# Patient Record
Sex: MALE
[De-identification: names, ages, dates, MRNs, and addresses within clinical notes are randomized per-mention and may not be internally consistent; named-entity substitution may affect disease eponyms.]

---

## 2018-12-11 ENCOUNTER — HOSPITAL ENCOUNTER (INPATIENT)
Dept: HOSPITAL 42 - ED | Age: 43
LOS: 2 days | Discharge: HOME | DRG: 312 | End: 2018-12-13
Attending: INTERNAL MEDICINE | Admitting: INTERNAL MEDICINE
Payer: COMMERCIAL

## 2018-12-11 VITALS — BODY MASS INDEX: 25.6 KG/M2

## 2018-12-11 DIAGNOSIS — I65.29: ICD-10-CM

## 2018-12-11 DIAGNOSIS — D72.821: ICD-10-CM

## 2018-12-11 DIAGNOSIS — F40.240: ICD-10-CM
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
## 2018-12-11 DIAGNOSIS — E78.5: ICD-10-CM

## 2018-12-11 DIAGNOSIS — I10: ICD-10-CM

## 2018-12-11 DIAGNOSIS — J02.9: ICD-10-CM

## 2018-12-11 DIAGNOSIS — R65.10: ICD-10-CM

## 2018-12-11 DIAGNOSIS — I95.9: ICD-10-CM

## 2018-12-11 DIAGNOSIS — D64.9: ICD-10-CM

## 2018-12-11 DIAGNOSIS — E55.9: ICD-10-CM

## 2018-12-11 DIAGNOSIS — E78.00: ICD-10-CM

## 2018-12-11 DIAGNOSIS — E78.1: ICD-10-CM

## 2018-12-11 DIAGNOSIS — J32.0: ICD-10-CM

## 2018-12-11 DIAGNOSIS — E86.1: ICD-10-CM

## 2018-12-11 DIAGNOSIS — R00.0: ICD-10-CM

## 2018-12-11 DIAGNOSIS — R55: Primary | ICD-10-CM

## 2018-12-11 DIAGNOSIS — R70.0: ICD-10-CM

## 2018-12-11 DIAGNOSIS — I07.1: ICD-10-CM

## 2018-12-11 DIAGNOSIS — E86.0: ICD-10-CM

## 2018-12-11 DIAGNOSIS — I27.20: ICD-10-CM

## 2018-12-11 DIAGNOSIS — N17.9: ICD-10-CM

## 2018-12-11 DIAGNOSIS — J32.2: ICD-10-CM

## 2018-12-11 LAB
ALBUMIN SERPL-MCNC: 4.8 G/DL (ref 3–4.8)
ALBUMIN/GLOB SERPL: 1.2 {RATIO} (ref 1.1–1.8)
ALT SERPL-CCNC: 52 U/L (ref 7–56)
APPEARANCE UR: CLEAR
APTT BLD: 24.3 SECONDS (ref 25.1–36.5)
AST SERPL-CCNC: 38 U/L (ref 17–59)
BASOPHILS # BLD AUTO: 0.02 K/MM3 (ref 0–2)
BASOPHILS NFR BLD: 0.2 % (ref 0–3)
BILIRUB UR-MCNC: NEGATIVE MG/DL
BNP SERPL-MCNC: 14.6 PG/ML (ref 0–450)
BUN SERPL-MCNC: 32 MG/DL (ref 7–21)
CALCIUM SERPL-MCNC: 9.5 MG/DL (ref 8.4–10.5)
COLOR UR: YELLOW
EOSINOPHIL # BLD: 0 10*3/UL (ref 0–0.7)
EOSINOPHIL NFR BLD: 0.4 % (ref 1.5–5)
ERYTHROCYTE [DISTWIDTH] IN BLOOD BY AUTOMATED COUNT: 13 % (ref 11.5–14.5)
GFR NON-AFRICAN AMERICAN: 47
GLUCOSE UR STRIP-MCNC: NEGATIVE MG/DL
GRANULOCYTES # BLD: 8.32 10*3/UL (ref 1.4–6.5)
GRANULOCYTES NFR BLD: 81.1 % (ref 50–68)
HGB BLD-MCNC: 13.7 G/DL (ref 14–18)
INR PPP: 1.18
LEUKOCYTE ESTERASE UR-ACNC: NEGATIVE LEU/UL
LYMPHOCYTES # BLD: 1.4 10*3/UL (ref 1.2–3.4)
LYMPHOCYTES NFR BLD AUTO: 13.5 % (ref 22–35)
MCH RBC QN AUTO: 27.1 PG (ref 25–35)
MCHC RBC AUTO-ENTMCNC: 33.3 G/DL (ref 31–37)
MCV RBC AUTO: 81.4 FL (ref 80–105)
MONOCYTES # BLD AUTO: 0.5 10*3/UL (ref 0.1–0.6)
MONOCYTES NFR BLD: 4.8 % (ref 1–6)
PH UR STRIP: 6 [PH] (ref 4.7–8)
PLATELET # BLD: 246 10^3/UL (ref 120–450)
PMV BLD AUTO: 9.5 FL (ref 7–11)
PROT UR STRIP-MCNC: NEGATIVE MG/DL
PROTHROMBIN TIME: 13.6 SECONDS (ref 9.4–12.5)
RBC # BLD AUTO: 5.05 10^6/UL (ref 3.5–6.1)
RBC # UR STRIP: NEGATIVE /UL
SP GR UR STRIP: 1.02 (ref 1–1.03)
T4 FREE SERPL-MCNC: 0.75 NG/DL (ref 0.78–2.19)
T4 SERPL-MCNC: 6.1 UG/DL (ref 5.5–11)
TROPONIN I SERPL-MCNC: < 0.01 NG/ML
URATE SERPL-MCNC: 6.9 MG/DL (ref 3.5–8.5)
UROBILINOGEN UR STRIP-ACNC: 0.2 E.U./DL
WBC # BLD AUTO: 10.3 10^3/UL (ref 4.5–11)

## 2018-12-11 RX ADMIN — ENOXAPARIN SODIUM SCH MG: 40 INJECTION SUBCUTANEOUS at 22:12

## 2018-12-11 NOTE — HP
DATE OF EXAM:  12/11/2018



HISTORY OF PRESENT ILLNESS:  The patient is a 43-year-old  male who

presented with history of recurrent dizziness while the patient has been

experiencing for few weeks to days.  The patient complains of severe and

recurrent dizziness, lightheadedness, but today while the patient was at

work the presented with a loss of consciousness and syncopal episode

according to the patient and the ER records.  The patient had a syncopal

episode and loss of consciousness at work.  The patient complained of

worsening dizziness and lightheadedness and the patient fell on the floor

with loss of consciousness, but regained consciousness spontaneously. 

According to the patient, his symptoms of dizziness and lightheadedness has

been going on for weeks and months which has been increasing in severity

and frequency.  The patient also complained of rib cage pain after the

patient experienced fall.



CODE STATUS:  Full code.



LIVING WILL/ADVANCE DIRECTIVE:  None.



ALLERGIES  None.



Height is 5 feet 2 inches.



Weight is 140.



BMI is 26.



HOME MEDICATIONS:  Percocet 1 tablet every 6 p.r.n., Naprosyn 500 every 6

p.r.n., Flexeril 10 mg every 8.



PAST MEDICAL AND SURGICAL HISTORY:  History of questionable hypertension,

history of dizziness and lightheadedness, history of hypertension.  Past

medical history is significant for hypertension and dizziness.  _____ The

patient's family history is negative.  The patient denies any surgical

history.  Denies any history of cerebral stroke.  Denies any history of

coronary artery disease.  Denies any surgical history.



FAMILY HISTORY:  _____ Not available.



SOCIAL HISTORY:  Positive for alcohol use.  Denies substance abuse.  Denies

smoking.  Denies drug use.  Denies communicable transmissible disease.



OCCUPATIONAL HISTORY:  The patient is employed.



REVIEW OF SYSTEMS:  A 14-system review was done, pertinent positive

negative dictated above.



PHYSICAL EXAMINATION

GENERAL:  The patient is seen in stretcher #8 in the emergency room.  The

patient is lying in the bed, appears to be anxious.  The patient is seen

lying in the stretcher.

VITAL SIGNS:  T-max is 98, heart rate 83 to 90.  The telemetry shows normal

sinus rhythm.  Blood pressures 105/62, respiration 17 to 18, O2 sat 98% to

99%.

HEENT:  Head is normocephalic, atraumatic.  HEENT examination shows pink

conjunctivae.  Anicteric sclerae.  No oropharyngeal lesion.

CHEST:  Kyphosis.

NECK:  No neck rigidity.

CARDIOPULMONARY:  S1, S2, regular rhythm.  No audible murmur, gallop or

rub.

LUNGS:  Shows no audible crackle, rales or wheezing.

ABDOMEN:  Soft.  Positive bowel sound.  No palpable hepatosplenomegaly.  No

organomegaly.  No guarding.  No rigidity.  No rebound tenderness.

GENITALIA:  Male.

RECTAL:  Deferred.

EXTREMITIES:  Shows no pitting edema.  No calf tenderness.  No Homans'

sign.

NEUROLOGIC:  The patient is alert, awake, oriented x3.  He is able to move

upper and lower extremity without assistance.  The patient has multiple

tattoos on the skin, on the body.  Motor strength is 5/5 in upper and lower

extremity.  Gait examination is not tested.  Cranial nerves II-XII grossly

intact.

VASCULAR:  Palpable pulses.



DIAGNOSTICS:  WBC 10.3, hemoglobin and hematocrit 13.7 and 41.1, platelet

246.  Granulocytes 81% segs.  ESR is 21 which is borderline elevated.  PT

and PTT 13.6 and 24.3.  Sodium 137, potassium 4.5, chloride 98, CO2 31,

anion gap 12, BUN 32, creatinine 1.6.  GFR 47.  Glucose 108, uric acid 6.9.

Calcium 9.5.  LFTs are normal.  Troponin is normal.  BNP is negative. 

Urinalysis, pH 6.0, specific gravity 1.020.  Rest of the urinalysis

negative.  Urine drug screen is negative.  Chest x-ray, the patient had a

chest x-ray done which shows no active pulmonary disease.  EKG done in the

emergency room which shows sinus rhythm with right axis deviation.  CT head

was done in the emergency room which shows unremarkable CT scan of the

head.  The patient was seen in the emergency room.  The patient was

evaluated.



IMPRESSION:

1.  Syncope, etiology undetermined with history of recurrent dizziness and

lightheadedness.

2.  Hypotension with hypovolemia.

3.  Mild normocytic anemia with granulocytosis.

4.  Acute kidney injury.

5.  Right axis deviation.

6.  History of hypertension, but normotensive at present.

7.  Questionable anxiety disorder.



PLAN:  At this time, the patient will be placed on telemetry.  The patient

has been ordered repeat labs, repeat cardiac enzyme, repeat troponin,

repeat EKG.  RPR, Lyme disease, hemoglobin A1c, thyroid panel has been

ordered.  Repeat CBC has been ordered.  Cardiology, Neurology consultation

ordered.  RPR ordered.  The patient is started on Colace 100 three times a

day, Ecotrin 81 mg daily, Ringer's lactate 100 mL an hour, Lipitor 40 mg

daily, Lovenox 40 mg subcutaneously daily, Protonix 40 mg daily.  The

patient received 2 liters of IV fluid in the emergency room.  The patient

was started on Tylenol 650 every 6 p.r.n., Xanax 0.25 p.o. every 8 p.r.n.,

Zofran 4 mg IV every 4 p.r.n.  Carotid Doppler, MRI, MRA of the brain, EEG,

echo with Doppler.  Repeat EKG has been ordered.  The patient has been

started on heart healthy diet.  The patient has been put on head of the bed

at 30 degrees, neuro checks, seizure precaution.  All above has been

ordered.



Dictated and electronically signed, not read.







__________________________________________

Mo Patel MD





DD:  12/11/2018 19:25:19

DT:  12/11/2018 19:29:27

Job # [id number]

## 2018-12-11 NOTE — CARD
--------------- APPROVED REPORT --------------





Date of service: 12/11/2018



EKG Measurement

Heart [id number]

[id number]

[id number]

[id number]

[id number]



<Conclusion>

Normal sinus rhythm

Rightward axis

Borderline ECG

## 2018-12-11 NOTE — CT
Date of service: 



12/11/2018



PROCEDURE:  CT HEAD WITHOUT CONTRAST.



HISTORY:

loss of consciousness



COMPARISON:

None available.



TECHNIQUE:

Axial computed tomography images were obtained through the head/brain 

without intravenous contrast.  



Radiation dose:



Total exam DLP = 815.28 mGy-cm.



This CT exam was performed using one or more of the following dose 

reduction techniques: Automated exposure control, adjustment of the 

mA and/or kV according to patient size, and/or use of iterative 

reconstruction technique.



FINDINGS:



HEMORRHAGE:

No intracranial hemorrhage. 



BRAIN:

Normal gray-white matter differentiation and density are appreciated 

throughout the cerebrum and cerebellum with the brainstem appearing 

unremarkable as well.  There is no mass effect.  There is no 

suspicious extra-axial fluid collection and the midline brain anatomy 

appears diffusely unremarkable. 



VENTRICLES:

Unremarkable. No hydrocephalus. 



CALVARIUM:

Unremarkable.



PARANASAL SINUSES:

Limited bilateral ethmoid sinusitis.



MASTOID AIR CELLS:

Unremarkable as visualized. No inflammatory changes.



OTHER FINDINGS:

None.



IMPRESSION:

Unremarkable noncontrast head CT as per above.

## 2018-12-11 NOTE — RAD
Date of service: 



12/11/2018



HISTORY:

 chest pain 



COMPARISON:

No prior. 



FINDINGS:



LUNGS:

No active pulmonary disease.



PLEURA:

No significant pleural effusion identified, no pneumothorax apparent.



CARDIOVASCULAR:

No atherosclerotic calcification present



Normal.



OSSEOUS STRUCTURES:

No significant abnormalities.



VISUALIZED UPPER ABDOMEN:

Normal.



OTHER FINDINGS:

None.



IMPRESSION:

No active disease.

## 2018-12-11 NOTE — ED PDOC
Arrival/HPI





- General


Time Seen by Provider: 12/11/18 13:33


Historian: Patient





- History of Present Illness


Narrative History of Present Illness (Text): 





12/11/18 13:45


43 year old male, with no significant past medical history, presents to the 

Emergency department s/p syncopal episode at work prior to arrival. Patient 

states a sudden onset of dizziness leading him to lose consciousness and fall to

the floor for couple of minutes. Patient soon regained consciousness and was at 

his baseline. Patient currently reports dizziness and states the symptoms have 

been ongoing for 3 months, progressively increasing in frequency. Patient 

describes the dizziness as room spinning associated with nausea. Patient reports

visiting his PMD with the presented symptoms without any significant findings. 

Patient currentlt denies any other associated somatic complaints. Patient denies

any fevers, chills, headache, chest pain, shortness of breath, dyspnea on 

exertion, cough, abdominal pain, vomiting, diarrhea, back pain, neck pain, or 

any other complaints. Patient denies any significant family cardiac history.


 


Time/Duration: Prior to Arrival


Symptom Onset: Sudden


Symptom Course: Improving


Activities at Onset: Light


Context: Work





Past Medical History





- Provider Review


Nursing Documentation Reviewed: Yes





- Infectious Disease


Hx of Infectious Diseases: None





- Tetanus Immunization


Tetanus Immunization: Unknown





- Psychiatric


Hx Substance Use: No





- Anesthesia


Hx Anesthesia: No





Family/Social History





- Physician Review


Nursing Documentation Reviewed: Yes


Family/Social History: Unknown Family HX


Smoking Status: Never Smoked


Hx Alcohol Use: No


Hx Substance Use: No





Allergies/Home Meds


Allergies/Adverse Reactions: 


Allergies





No Known Allergies Allergy (Verified 12/16/16 17:36)


   








Home Medications: 


                                    Home Meds











 Medication  Instructions  Recorded  Confirmed


 


Naproxen [Naprosyn] 500 mg PO Q6 PRN 12/16/16 12/16/16














Review of Systems





- Physician Review


All systems were reviewed & negative as marked: Yes





- Review of Systems


Constitutional: absent: Fevers


Eyes: absent: Vision Changes


Respiratory: absent: SOB, Cough


Cardiovascular: Chest Pain (Chronic, intermittent ).  absent: VALDOVINOS


Gastrointestinal: Abdominal Pain (Chronic), Nausea.  absent: Diarrhea, Vomiting


Genitourinary Male: absent: Dysuria, Urinary Output Changes


Musculoskeletal: absent: Back Pain, Neck Pain


Skin: absent: Rash


Neurological: Dizziness.  absent: Headache





Physical Exam





- Physical Exam


Narrative Physical Exam (Text): 





12/11/18 13:51


Gen: VS reviewed, alert, well developed, well nourished, nontoxic, mild 

distress.


ENT: normal pharynx.


Eye: EOMI, PERRL.


Neck: no JVD, supple, no adenopathy.


CV: regular rate, regular rhythm, no rubs, no murmur, no gallops, S1, S2, pulses

equal and strong.


Pulm: no distress, clear to auscultation, no wheeze, no rhonchi, breath sounds 

equal, no rales.


Abd: soft, nontender, no guarding, no rebound, no rigidity, normal bowel sounds.


Ext: no edema.


Skin: good color, no rash, no cyanosis.


Psych: responds appropriately to questions, normal affect.


Neuro: oriented x 3, CN2-12 intact grossly, motor intact, sensation intact.





Vital Signs Reviewed: Yes





Vital Signs











  Temp Pulse Resp BP Pulse Ox


 


 12/11/18 13:43  98.0 F  90  17  105/62  99


 


 12/11/18 13:33  98.4 F  83  18  105/62  99











Temperature: Afebrile


Blood Pressure: Normal


Pulse: Regular


Respiratory Rate: Normal


Appearance: Positive for: Well-Appearing, Non-Toxic, Comfortable


Pain Distress: None


Mental Status: Positive for: Alert and Oriented X 3





Medical Decision Making


ED Course and Treatment: 





12/11/18 13:44


Impression: 43 year old male presents to the ED s/p syncopal episode prior to 

arrival. 





Plan:


-- EKG


-- Labs


-- Chest X-ray


-- IV fluids


-- Reassess and disposition





Prior Visits:


Notes and results from previous visits were reviewed. 





Progress Notes:








12/11/18 16:18


admit accepted by dr. fuentes. patient to be admitted for recurrent dizziness and 

syncopal episode. patient reports urinary frequency but no dysuria. bloodwork 

appears consistent with dehydration. will get follow up CT at the request of dr. fuentes.





12/11/18 17:14


Head CT reviewed, shows:


Unremarkable noncontrast head CT as per above. 





Chest X-ray 


Impression: 


no active disease





- RAD Interpretation


Narrative RAD Interpretations (Text): 





12/11/18 15:12


Chest X-ray reviewed by radiologist, shows:


FINDINGS:


LUNGS:


No active pulmonary disease.


PLEURA:


No significant pleural effusion identified, no pneumothorax apparent.


CARDIOVASCULAR:


No atherosclerotic calcification present


Normal.


OSSEOUS STRUCTURES:


No significant abnormalities.


VISUALIZED UPPER ABDOMEN:


Normal.


OTHER FINDINGS:


None.


IMPRESSION:


No active disease.


Radiology Orders: 











12/11/18 13:44


CHEST PORTABLE [RAD] Stat 











: Radiologist





- EKG Interpretation


EKG Interpretation (Text): 





12/11/18 15:09


1323: nsr at 83 bpm, nml qrs, nml axis, no acute sttw abn


Interpreted by ED Physician: Yes





- Scribe Statement


The provider has reviewed the documentation as recorded by the Scribe


Abida Short.





All medical record entries made by the Lindaibe were at my direction and 

personally dictated by me. I have reviewed the chart and agree that the record 

accurately reflects my personal performance of the history, physical exam, 

medical decision making, and the department course for this patient. I have also

 personally directed, reviewed, and agree with the discharge instructions and 

disposition.








Disposition/Present on Arrival





- Present on Arrival


History of DVT/PE: No


History of Uncontrolled Diabetes: No


Urinary Catheter: No


History Surgical Site Infection Following: None





- Disposition

## 2018-12-12 VITALS — RESPIRATION RATE: 20 BRPM

## 2018-12-12 LAB
ALBUMIN SERPL-MCNC: 4 G/DL (ref 3–4.8)
ALBUMIN/GLOB SERPL: 1.2 {RATIO} (ref 1.1–1.8)
ALT SERPL-CCNC: 55 U/L (ref 7–56)
AST SERPL-CCNC: 32 U/L (ref 17–59)
B BURGDOR IGM SERPL QL IA: NEGATIVE
BASE EXCESS BLDV CALC-SCNC: 4.2 MMOL/L (ref 0–2)
BASOPHILS # BLD AUTO: 0.02 K/MM3 (ref 0–2)
BASOPHILS NFR BLD: 0.2 % (ref 0–3)
BILIRUB DIRECT SERPL-MCNC: 0.3 MG/DL (ref 0–0.4)
BUN SERPL-MCNC: 25 MG/DL (ref 7–21)
CALCIUM SERPL-MCNC: 8.9 MG/DL (ref 8.4–10.5)
EOSINOPHIL # BLD: 0.2 10*3/UL (ref 0–0.7)
EOSINOPHIL NFR BLD: 2.5 % (ref 1.5–5)
EOSINOPHIL NFR BLD: 3 % (ref 0–3)
ERYTHROCYTE [DISTWIDTH] IN BLOOD BY AUTOMATED COUNT: 13 % (ref 11.5–14.5)
FOLATE SERPL-MCNC: 8.1 NG/ML
GFR NON-AFRICAN AMERICAN: > 60
GRANULOCYTES # BLD: 5.57 10*3/UL (ref 1.4–6.5)
GRANULOCYTES NFR BLD: 63.8 % (ref 50–68)
HDLC SERPL-MCNC: 30 MG/DL (ref 29–60)
HEPATITIS A IGM: NEGATIVE
HEPATITIS B CORE AB: NEGATIVE
HEPATITIS C ANTIBODY: NEGATIVE
HGB BLD-MCNC: 11.7 G/DL (ref 14–18)
LDLC SERPL-MCNC: 107 MG/DL (ref 0–129)
LYMPHOCYTE: 9 % (ref 22–35)
LYMPHOCYTES # BLD: 1.1 10*3/UL (ref 1.2–3.4)
LYMPHOCYTES NFR BLD AUTO: 12.2 % (ref 22–35)
MCH RBC QN AUTO: 26.8 PG (ref 25–35)
MCHC RBC AUTO-ENTMCNC: 32.7 G/DL (ref 31–37)
MCV RBC AUTO: 82.1 FL (ref 80–105)
MONOCYTE: 20 % (ref 1–6)
MONOCYTES # BLD AUTO: 1.9 10*3/UL (ref 0.1–0.6)
MONOCYTES NFR BLD: 21.3 % (ref 1–6)
NEUTROPHILS NFR BLD AUTO: 66 % (ref 50–70)
PH BLDV: 7.43 [PH] (ref 7.32–7.43)
PLATELET # BLD: 203 10^3/UL (ref 120–450)
PMV BLD AUTO: 9.7 FL (ref 7–11)
RAPID PLASMA REAGIN: NONREACTIVE
RBC # BLD AUTO: 4.36 10^6/UL (ref 3.5–6.1)
TROPONIN I SERPL-MCNC: < 0.01 NG/ML
VARIANT LYMPHS NFR BLD MANUAL: 2 % (ref 0–0)
VENOUS BLOOD FIO2: 21 %
VENOUS BLOOD GAS PCO2: 44 (ref 40–60)
VENOUS BLOOD GAS PO2: 61 MM/HG (ref 30–55)
VIT B12 SERPL-MCNC: 348 PG/ML (ref 239–931)
WBC # BLD AUTO: 8.7 10^3/UL (ref 4.5–11)

## 2018-12-12 RX ADMIN — ACETYLCYSTEINE SCH ML: 200 INHALANT RESPIRATORY (INHALATION) at 20:29

## 2018-12-12 RX ADMIN — PURIFIED WATER PRN LOZ: 99.05 LIQUID OPHTHALMIC at 11:03

## 2018-12-12 RX ADMIN — LEVALBUTEROL SCH MG: 0.63 SOLUTION RESPIRATORY (INHALATION) at 20:29

## 2018-12-12 RX ADMIN — LEVALBUTEROL SCH MG: 0.63 SOLUTION RESPIRATORY (INHALATION) at 08:55

## 2018-12-12 RX ADMIN — ACETYLCYSTEINE SCH ML: 200 INHALANT RESPIRATORY (INHALATION) at 13:17

## 2018-12-12 RX ADMIN — PANTOPRAZOLE SODIUM SCH: 40 TABLET, DELAYED RELEASE ORAL at 05:49

## 2018-12-12 RX ADMIN — ACETYLCYSTEINE SCH ML: 200 INHALANT RESPIRATORY (INHALATION) at 08:55

## 2018-12-12 RX ADMIN — LEVALBUTEROL SCH MG: 0.63 SOLUTION RESPIRATORY (INHALATION) at 13:17

## 2018-12-12 RX ADMIN — ENOXAPARIN SODIUM SCH MG: 40 INJECTION SUBCUTANEOUS at 11:03

## 2018-12-12 NOTE — CARD
--------------- APPROVED REPORT --------------





Date of service: 12/12/2018



EKG Measurement

Heart [id number]

[id number]

[id number]

[id number]

[id number]



<Conclusion>

Sinus tachycardia

Rightward axis

Borderline ECG

## 2018-12-12 NOTE — PN
DATE:  12/12/2018



SUBJECTIVE:  The patient is seen in room 378, bed 2.  The patient is seen

lying in the bed, complaining of diffuse myalgia, aches and pain, sore

throat, congestion and coughing.  The patient was seen lying in the bed

with the patient's nurse at bedside.  The patient also appears to be

anxious.  Overnight nurse's notes were reviewed.  The patient was able to

ambulate without any assistance.



PHYSICAL EXAMINATION:

VITAL SIGNS:  T-max was 101.1 overnight, down to 99.2-98.1, heart rate was

90, 95, 97, 100, blood pressure 116/78, 119/75, respiration 20, O2 sat 97%.

GENERAL:  The patient is seen lying in the bed.  Head examination,

normocephalic, atraumatic.

HEENT examination shows pink conjunctiva.  Pinkish conjunctivae.  Anicteric

sclerae.  No oropharyngeal lesion.  Questionable and positive pharyngeal

erythema noted.  No exudate noted.  No neck rigidity.

CHEST:  Symmetrical.

LUNGS:  Examination shows occasional rhonchi upper lung fields.

CARDIOVASCULAR:  S1, S2, regular rhythm.  Telemetry shows sinus rhythm,

sinus tachycardia.  No audible murmur or rub.

ABDOMEN:  Soft.  Positive bowel sound.  No palpable hepatosplenomegaly.

GENITALIA:  Male.

RECTAL:  Examination is deferred.

EXTREMITIES:  Shows no pitting edema, no calf tenderness, no Bekah's signs.

NEUROLOGIC:  The patient is alert, awake, oriented x3.  Cranial nerves

II-XII intact.  Gait examination is independent.

VASCULAR:  Palpable pulses.

PSYCHIATRIC:  Questionable positive for anxiety.  No auditory, visual

hallucination.  No suicidal, homicidal ideation.



DIAGNOSTICS:  12/12/2018, WBC 8.7, hemoglobin/hematocrit 11.7/35.8,

platelet 203, monocytes at 20.  Sodium 137, potassium 3.7, chloride 100,

CO2 of 29, anion gap 12, BUN down to 25 from 32.  Creatinine is down to 1.1

from 1.6.  LFTs are normal.  Glucose 108, uric acid 6.9, calcium 8.9,

phosphorus 3.4, magnesium 1.7.  LFTs are normal.  Troponin all three sets

are negative.  Triglyceride 241, cholesterol 180, .  Thyroid profile

is within normal limits.  The patient is also complaining of nocturia and

frequency urination.  The patient's urinalysis was negative.  Urine drug

screen is negative.  EKG from today was reviewed which shows sinus

tachycardia, right axis deviation which is similar to the one from

yesterday.



IMPRESSION:

1.  Near syncope and symptoms of dizziness, lightheadedness.

2.  Myalgia.

3.  Fever.

4.  Pharyngitis.

5.  High-grade fever of 101.

6.  Tachycardia.

7.  Questionable systemic inflammatory response syndrome.

8.  Hypotension.

9.  Tachycardia.

10.  Normocytic anemia with granulocytosis.

11.  Monocytosis.

12.  Elevated erythrocyte sedimentation rate.

13.  Acute kidney injury (resolved).

14.  Hypertriglyceridemia and hypercholesteremia with elevated LDL,

decreased HDL.

15.  Questionable nocturia and urinary frequency.

16.  Right axis deviation.

17.  Sinus tachycardia.

18.  Generalized myalgia.



PLAN:  Plan at this time, the patient's hepatitis serologies, B12, folate,

hemoglobin A1c has been ordered and pending.  The patient has been ordered

vitamin D.  The patient's has been ordered repeat CMP, LFT, magnesium,

phosphorus for the morning.  Fructosamine, GlycoMark, HIV, Lyme titers, CBC

is ordered.  Blood and urine cultures are ordered.  Cardiology and

neurology consultation is pending.  The patient has been ordered Rapid

strep, Rapid flu, Lyme, IgG, IgM Rapid RPR.  The patient is started on

Mucomyst nebulizer 20% 4 mL every 6 hours, Cepacol lozenges every 2 hours

p.r.n. Colace 100 mg three times a day, Ecotrin 81 mg daily, IV fluid,

Ringer's lactate at 125 mL an hour, Lipitor 40 mg daily Lovenox 40 mg

subcutaneously daily, Protonix 40 mg daily, the patient is empirically

started on Tamiflu 75 mg twice a day, Tessalon Perles 200 three times a

day, Tylenol 650 mg p.o. suppository every 6 hours p.r.n., Xanax 0.25 every

8 hours p.r.n. for anxiety, Xopenex nebulizer 0.63 mg every 6 hours, Zofran

4 mg IV every 4 hours p.r.n.  The patient's carotid Doppler, MRI, MRA of

the brain, echo with Doppler, EEG is pending.  The patient is on oxygen,

incentive spirometry.  Neuro checks, seizure precaution, out of bed.  The

patient's further management will be dependent upon the patient's clinical

condition, hemodynamic status and as per the patient response to

therapeutic intervention as per the patient's diagnostic test results and

as per recommendation by all the physician involved in the care of the

patient.



Dictated and electronically signed, not read.







__________________________________________

Mo Patel MD



DD:  12/12/2018 9:35:02

DT:  12/12/2018 9:40:24

Job # [id number]

## 2018-12-12 NOTE — CON
DATE:  12/12/2018





NEUROLOGY CONSULTATION



CHIEF COMPLAINT:  Syncope.



HISTORY OF PRESENT ILLNESS:  This is a 43-year-old man with no significant

past medical history. who was at work today _____ syncopal episode,

apparently has had some sudden onset of dizziness in terms of feeling

slightly lightheaded and had lead him to lose unconscious for a few seconds

and fall to the floor, regained consciousness at his baseline.  No history

of seizures.  No history of traumatic brain injury.  No history of febrile

seizures as a child.  _____ the room spinning with some nausea, this is

most likely vertiginous aspect.  MRI of the brain unremarkable for anything

acute as well as MRI of the head.  Carotid Doppler showed 29%-39% proximal

ICA stenosis with antegrade flow in the vertebral arteries.  He is mildly

dehydrated, had low systolic and diastolic blood pressures for his height

and weight.  His PSA was normal, elevated BUN.  He works aggressively at

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  He is on Xanax for anxiety p.r.n.



PAST MEDICAL HISTORY:  Nothing significant.



SOCIAL HISTORY:  No illicit drug use, smoking, or EtOH abuse.



REVIEW OF SYSTEMS:  A 14-point review of systems is negative except as per

the HPI.



FAMILY HISTORY:  Noncontributory.



MEDICATIONS:  Reviewed by nurses' reconciliation sheet.



LABORATORY DATA:  Sodium is 137, potassium of 3.7, chloride 100, carbon

dioxide 29, BUN of 25, creatinine 1.1, random glucose of 108, and A1c 6.4.



PHYSICAL EXAMINATION:

VITAL SIGNS:  Temperature 98.4, pulse rate 94, blood pressure 108/72,

respiratory rate of 20, and oxygen saturation 100% by room air.

GENERAL:  The patient is sitting up in bed, in no acute distress.

HEENT:  Atraumatic and normocephalic.  PERRLA.  Extraocular muscles are

intact.

NECK:  Supple.  No JVD.  No adenopathy noted.

LUNGS:  Clear to auscultation.  No adventitious sounds.

HEART:  S1 and S2.  Normal rate and rhythm.  No murmurs, rubs, or gallops.

ABDOMEN:  Soft, nontender, and nondistended.  Bowel sounds are present.

EXTREMITIES:  No clubbing.  No cyanosis.  Peripheral pulses 2+ felt

bilaterally.

NEUROLOGIC:  The patient is alert and oriented to person, place, month and

year.  Speech is fluent without errors.  Cranial nerves II through are XII

intact.  Motor exam; moves all extremities equally.  No pronator drift

seen.  Sensory exam:  Light touch, pinprick, proprioception, and vibration

are intact.  DTRs are 2+ throughout.  Coordination; finger-to-nose intact. 

No dysmetria noted.  Gait is deferred for now.



IMPRESSION:  Syncope, this most likely seems vasovagal and seems like

mildly dehydrated.  Carotid Doppler showed no significant hemodynamic

stenosis.  In addition, MRI and MRA of the head showed no intracranial

abnormality.  This is unlikely seizure, he had most likely neurocardiogenic

syncope.



At this time, we recommend;

1.  Adequate hydration.

2.  Salt restriction diet to reduce the vertiginous symptoms.

3.  PT/OT evaluation and follow up as an outpatient.







__________________________________________

Rene Small MD





DD:  12/12/2018 17:01:47

DT:  12/12/2018 18:49:51

Job # [id number]

## 2018-12-12 NOTE — MRI
Date of service: 



12/12/2018



PROCEDURE:  Magnetic Resonance Angiography Brain



HISTORY:

Syncope 







COMPARISON:

None available. 



TECHNIQUE:

3D time of flight MR angiography of the intracranial arteries was 

performed. Rotating maximum intensity projection images were 

generated.



FINDINGS:



INTERNAL CAROTID ARTERIES:

Normal flow related signal.  The skull base, petrous, cavernous and 

supraclinoid segments are bilaterally widely patient. 



ANTERIOR CEREBRAL ARTERIES:

Normal flow related signal. A1 and A2 segments are widely patent. 

Smaller distal branches unremarkable, as visualized.



MIDDLE CEREBRAL ARTERIES:

Normal flow related signal. M1 and M2 segments are widely patent. 

Perisylvian branches grossly symmetric.



POSTERIOR CIRCULATION:

Basilar Artery: Unremarkable.



Distal Vertebral Arteries: Unremarkable.  Right vertebral artery is 

hypoplastic, an anatomic variant. 



Posterior Cerebral Arteries: Unremarkable.



Posterior Inferior Cerebellar Arteries: Unremarkable.



ANEURYSM/ VASCULAR MALFORMATIONS:

None.



OTHER FINDINGS:

None. 



IMPRESSION:

Unremarkable MR angiography of the brain.

## 2018-12-12 NOTE — MRI
Date of service: 



12/12/2018



PROCEDURE:  MRI BRAIN WITH AND WITHOUT CONTRAST



HISTORY:

Dizziness 



COMPARISON:

Noncontrast head CT from 12/11/2018. 



TECHNIQUE:

Multiplanar, multisequence MR images of the brain were obtained with 

and without intravenous contrast enhancement.  15 mL Omniscan was 

injected intravenously. 



FINDINGS:



HEMORRHAGE:

None



DWI:

No evidence of an acute or early subacute infarction.



BRAIN PARENCHYMA:

There are multifocal T2/FLAIR hyperintense foci in the subcortical 

supratentorial white matter, larger in the left parietal subcortical 

white matter.  There is no mass, mass effect or abnormal extra-axial 

fluid collection.  The midline sagittal structures are normal. 



ENHANCEMENT:

No abnormal intracranial enhancement.



VENTRICLES:

The ventricles are normal in size, shape and configuration.



CRANIUM:

There is normal bone marrow signal pattern.



ORBITS:

Grossly unremarkable.



PARANASAL SINUSES/MASTOIDS:

There is moderate polypoid mucosal thickening in the right maxillary 

sinus, mild polypoid mucosal thickening in the left maxillary sinus 

and ethmoid air cells.  The mastoid air cells are clear.



VASCULAR SYSTEM:

There are normal signal voids in the larger intracranial arteries. 



OTHER FINDINGS:

None .



IMPRESSION:

No acute intracranial abnormality.



Multifocal supratentorial white matter changes are strictly 

nonspecific, the differential considerations include migraine 

headache effect, gliosis, early chronic microangiopathic changes, 

Lyme disease, vasculitis and demyelinating disease including multiple 

sclerosis.



Chronic maxillary and ethmoid sinusitis, worse in the right maxillary 

sinus.

## 2018-12-12 NOTE — US
PROCEDURE:  Bilateral carotid artery duplex ultrasound 



HISTORY:

Carotid stenosis syncope



PHYSICIAN(S):  Scott Guerrero MD.



TECHNIQUE:

Duplex sonography and color-flow Doppler were used to evaluate the 

carotid bifurcations and limited segments of the vertebral arteries 

bilaterally.



FINDINGS:

There is mild smooth hypoechoic plaque noted at the carotid 

bifurcations bilaterally. The peak systolic velocity in the proximal 

right internal carotid artery is 68 cm/sec. This corresponds to a 20 

to 39% proximal right ICA stenosis. Normal systolic velocities are 

noted in the proximal right external carotid artery. There is 

antegrade flow in the right vertebral artery.



The peak systolic velocity in the proximal left internal carotid 

artery is 92 cm/sec. This corresponds to a 20 to 39% proximal left 

ICA stenosis. Normal systolic velocities are noted in the proximal 

left external carotid artery. There is antegrade flow in the left 

vertebral artery.



IMPRESSION:

1. Bilateral 20-39% proximal ICA stenoses.



2. Antegrade flow in both vertebral arteries.

## 2018-12-12 NOTE — CARD
--------------- APPROVED REPORT --------------





Date of service: 12/12/2018



EXAM: Two-dimensional and M-mode echocardiogram with Doppler and 

color Doppler.



INDICATION

Syncope 



2D DIMENSIONS 

Left Atrium (2D)3.1   (1.6-4.0cm)IVSd1.0   (0.7-1.1cm)

LVDd4.3   (3.9-5.9cm)PWd1.0   (0.7-1.1cm)

LVDs2.8   (2.5-4.0cm)FS (%) 36.3   %

LVEF (%)66.3   (>50%)



M-Mode DIMENSIONS 

Aortic Root3.20   (2.2-3.7cm)Aortic Cusp Exc.1.80   (1.5-2.0cm)



Aortic Valve

AoV Peak Vzsxvdil974.0cm/Kings Peak GR.10mmHg



Mitral Valve

MV E Hppgykve03.0cm/sMV A Gswbpmfk47.7cm/sE/A ratio0.8



TDI

Lateral E' Peak V10.60cm/sMedial E' Peak V9.26cm/sE/Lateral E'7.0

E/Medial E'8.0



Pulmonary Valve

PV Peak Wwcabvja394.0cm/sPV Peak Grad.4mmHg



Tricuspid Valve

TR Peak Veozcjlq961mn/sRAP XHZQZIOC98cgYfGX Peak Gr.29mmHg

QWVL37zpZr



 LEFT VENTRICLE 

The left ventricle is normal size. There is normal left ventricular 

wall thickness. The left ventricle is hyperdynamic. There is normal 

LV segmental wall motion. Transmitral Doppler flow pattern is Grade 

I-abnormal relaxation pattern.



 RIGHT VENTRICLE 

The right ventricle is normal size. There is normal right ventricular 

wall thickness. The right ventricular systolic function is normal.



 ATRIA 

The left atrium size is normal. The right atrium size is normal.



 AORTIC VALVE 

The aortic valve is not well visualized. No aortic regurgitation is 

present. There is no aortic valvular stenosis.



 MITRAL VALVE 

The mitral valve is normal in structure. There is no mitral valve 

regurgitation noted. There is no mitral valve stenosis.



 TRICUSPID VALVE 

There is mild tricuspid regurgitation. There is mild pulmonary 

hypertension.



 GREAT VESSELS 

The aortic root is normal in size.



 PERICARDIAL EFFUSION 

There is no pericardial effusion.



<Conclusion>

There is normal left ventricular wall thickness.

The left ventricle is hyperdynamic.

There is normal LV segmental wall motion.

Transmitral Doppler flow pattern is Grade I-abnormal relaxation 

pattern.

There is mild tricuspid regurgitation.

There is mild pulmonary hypertension.

## 2018-12-13 VITALS — OXYGEN SATURATION: 97 %

## 2018-12-13 VITALS — TEMPERATURE: 98.7 F | HEART RATE: 89 BPM | DIASTOLIC BLOOD PRESSURE: 76 MMHG | SYSTOLIC BLOOD PRESSURE: 127 MMHG

## 2018-12-13 LAB
ALBUMIN SERPL-MCNC: 4.1 G/DL (ref 3–4.8)
ALBUMIN/GLOB SERPL: 1.1 {RATIO} (ref 1.1–1.8)
ALT SERPL-CCNC: 49 U/L (ref 7–56)
APPEARANCE UR: CLEAR
AST SERPL-CCNC: 32 U/L (ref 17–59)
B BURGDOR IGG SERPL QL IA: NEGATIVE
BASOPHILS # BLD AUTO: 0.03 K/MM3 (ref 0–2)
BASOPHILS NFR BLD: 0.3 % (ref 0–3)
BILIRUB DIRECT SERPL-MCNC: 0.2 MG/DL (ref 0–0.4)
BILIRUB UR-MCNC: NEGATIVE MG/DL
BUN SERPL-MCNC: 19 MG/DL (ref 7–21)
CALCIUM SERPL-MCNC: 9 MG/DL (ref 8.4–10.5)
COLOR UR: YELLOW
EOSINOPHIL # BLD: 0.2 10*3/UL (ref 0–0.7)
EOSINOPHIL NFR BLD: 1.7 % (ref 1.5–5)
ERYTHROCYTE [DISTWIDTH] IN BLOOD BY AUTOMATED COUNT: 12.9 % (ref 11.5–14.5)
FRUCTOSAMINE SERPL-SCNC: 195 UMOL/L (ref 190–270)
GFR NON-AFRICAN AMERICAN: > 60
GLUCOSE UR STRIP-MCNC: NEGATIVE MG/DL
GRANULOCYTES # BLD: 5.89 10*3/UL (ref 1.4–6.5)
GRANULOCYTES NFR BLD: 61.5 % (ref 50–68)
HGB BLD-MCNC: 11.8 G/DL (ref 14–18)
LEUKOCYTE ESTERASE UR-ACNC: NEGATIVE LEU/UL
LYMPHOCYTES # BLD: 1.6 10*3/UL (ref 1.2–3.4)
LYMPHOCYTES NFR BLD AUTO: 16.5 % (ref 22–35)
MCH RBC QN AUTO: 26.7 PG (ref 25–35)
MCHC RBC AUTO-ENTMCNC: 32.8 G/DL (ref 31–37)
MCV RBC AUTO: 81.4 FL (ref 80–105)
MONOCYTES # BLD AUTO: 1.9 10*3/UL (ref 0.1–0.6)
MONOCYTES NFR BLD: 20 % (ref 1–6)
PH UR STRIP: 6 [PH] (ref 4.7–8)
PLATELET # BLD: 205 10^3/UL (ref 120–450)
PMV BLD AUTO: 9.4 FL (ref 7–11)
PROT UR STRIP-MCNC: NEGATIVE MG/DL
RBC # BLD AUTO: 4.42 10^6/UL (ref 3.5–6.1)
RBC # UR STRIP: NEGATIVE /UL
SP GR UR STRIP: 1.02 (ref 1–1.03)
UROBILINOGEN UR STRIP-ACNC: 0.2 E.U./DL
WBC # BLD AUTO: 9.6 10^3/UL (ref 4.5–11)

## 2018-12-13 RX ADMIN — PANTOPRAZOLE SODIUM SCH MG: 40 TABLET, DELAYED RELEASE ORAL at 05:49

## 2018-12-13 RX ADMIN — ACETYLCYSTEINE SCH ML: 200 INHALANT RESPIRATORY (INHALATION) at 13:42

## 2018-12-13 RX ADMIN — LEVALBUTEROL SCH MG: 0.63 SOLUTION RESPIRATORY (INHALATION) at 08:47

## 2018-12-13 RX ADMIN — ENOXAPARIN SODIUM SCH MG: 40 INJECTION SUBCUTANEOUS at 09:30

## 2018-12-13 RX ADMIN — LEVALBUTEROL SCH MG: 0.63 SOLUTION RESPIRATORY (INHALATION) at 13:41

## 2018-12-13 RX ADMIN — PURIFIED WATER PRN LOZ: 99.05 LIQUID OPHTHALMIC at 05:55

## 2018-12-13 RX ADMIN — ACETYLCYSTEINE SCH ML: 200 INHALANT RESPIRATORY (INHALATION) at 08:42

## 2018-12-13 NOTE — DS
FINAL PROGRESS NOTE AND DISCHARGE SUMMARY



SUBJECTIVE:  The patient is in room 378, bed 2.  Overnight nurse's notes

were reviewed.  The patient initially was claustrophobic for the MRI

yesterday, but the patient was able to complete the MRI after sedation with

Ativan and Benadryl and the patient tolerated the procedure well.



The patient is seen in room 378, bed 2.  The patient was seen and evaluated

by Neurology.  Neurology recommendations were noted.



OBJECTIVE:

VITAL SIGNS:  Overnight vital signs; T-max 98.7, pulse 77 to 84.  Telemetry

shows sinus rhythm, heart rate 77 to 84, blood pressure 120/80,

respirations 20, and O2 sat 97%.

HEENT:  Head examination; normocephalic and atraumatic.  HEENT examination

shows pink conjunctivae.  Anicteric sclerae.  No oropharyngeal lesion.

NECK:  No neck rigidity.

CHEST:  Kyphosis.

LUNGS:  Shows no audible crackles, rales or wheezing.

CARDIOVASCULAR:  S1 and S2, regular rhythm.  Questionable positive

bilateral sinus tenderness.

ABDOMEN:  Soft.  Positive bowel sounds.  No palpable hepatosplenomegaly. 

No organomegaly.

GENITALIA:  Male.

RECTAL:  Deferred.

EXTREMITIES:  Shows no pitting edema.  No calf tenderness.  No Homans'

sign.

NEUROLOGIC:  The patient is alert, awake, oriented x3, is able to move

upper and lower extremity without assistance.  Gait examination is not

tested.

VASCULAR:  Palpable pulses.  Cranial nerves II through XII intact and

limited.

PSYCHIATRIC:  Questionable positive for anxiety due to hospitalization.

MUSCULOSKELETAL:  As per the body mass index.



DIAGNOSTIC DATA:  On 12/13/2018; WBC 9.6, hemoglobin/hematocrit 11.8 and

36, platelet 205,000, monocytes were 20 as of yesterday's CBC.  Sodium 137,

potassium 4.1, chloride 98, CO2 of 32, BUN 19, creatinine 0.9, glucose 96,

calcium 9, phosphorus 3.8, and magnesium 1.7.  LFTs are within normal

limit.  Vitamin D 25-hydroxy is 17.6.  PSA is 1.3.  CRP is 8.7 and

hemoglobin A1c 6.4 x2.  RPR is negative.  Hepatitis A, B, C serologies are

negative.  Influenza A and B titers are negative.  Strep group B is

negative.  Urinalysis is negative.  Echocardiogram shows left ventricular

ejection fraction 66%.  Right ventricular systolic pressure 39 mmHg,

hyperdynamic left ventricle grade 1 abnormal relaxation pattern, mild

tricuspid regurgitation, and pulmonary hypertension.  The patient was seen

and evaluated by Neurology.  Their impression was vasovagal syncope versus

neurocardiogenic syncope with secondary to dehydration and acute kidney

injury.  MRI and MRA of the brain was noted, which shows hyperintense foci

of the subcortical supratentorial white matter of the left parietal

subcortical white matter with bilateral maxillary and ethmoid sinusitis

right more than the left, noninvasive carotid Doppler 20-39% internal

carotid artery stenosis.  EKG shows right axis deviation.



FINAL IMPRESSION, PLAN, AND DISCHARGE DIAGNOSES:

1.  Recurrent dizziness and lightheadedness with possible vasovagal syncope

versus neurocardiogenic syncope secondary to dehydration.

2.  Acute kidney injury (resolved).

3.  Mild normocytic anemia.

4.  Monocytosis.

5.  Hypovitaminosis D.

6.  Elevated C-reactive protein of 8.7.

7.  Questionable and possible prediabetes with hemoglobin A1c of 6.4.

8.  Mild pulmonary hypertension and mild tricuspid regurgitation.

9.  Hyperdynamic left ventricle.

10.  Questionable history of hypertension.

11.  Mild pulmonary hypertension with right ventricular systolic pressure

of 39 mmHg.

12.  Mild tricuspid regurgitation.

13.  Left ventricular ejection fraction of 66%.

14.  Hyperintense foci of the subcortical supratentorial white matter

greater on the left parietal subcortical white matter.

15.  Bilateral maxillary and ethmoid sinusitis, right more than the left.

16.  Right axis deviation.

17.  A 20-39% internal carotid artery stenosis.

18.  Questionable anxiety disorder.

19.  Questionable history of hypertension.

20.  Hyperlipidemia with elevated LDL and decreased HDL.

21.  Hypertriglyceridemia.

22.  Questionable history of nocturia with normal prostate-specific antigen

and negative urinalysis.



Plan at this time, the patient was seen by Neurology.  No further

recommendations were noted except for hydration.  We are awaiting for

Cardiology evaluation.  The patient is awaiting for EEG, which is to be

done.  At present, the patient will be considered for discharge after

clearance by Cardiology and Neurology after EEG is done.  The patient will

be considered for discharge after EEG is done and after cleared by

Cardiology and Neurology.



DISCHARGE MEDICATIONS:

1.  Ecotrin 81 mg daily.

2.  Augmentin 875 twice a day for 2 weeks.

3.  Lipitor 40 mg daily.

4.  Drisdol 50,000 units weekly.

5.  The patient is also advised to resume his antihypertensive at home,

which he has not told us which medications he takes for hypertension.



DISCHARGE FOLLOWUP:  Discharge follow up will be with Dr. Patel within 1

week.  The patient has been explained about the details of his medical

condition, diagnosis, test results at length in layman's language.  All

above questions concerned answered to the patient's satisfaction.  The

patient was advised about the details of the recommendation by Neurology

and all the physician involved in the care of the patient.  The patient was

also explained about the details of all the test results, which were

performed.  At present, we are awaiting for the EEG result, EEG which is

pending.  The results of EEG will be followed up in the office.  The

patient was advised to follow up in the office within 1 week.



Time spent in the discharge 45 minutes.



Dictated and electronically signed, not read.







__________________________________________

Mo Patel MD





DD:  12/13/2018 8:54:19

DT:  12/13/2018 8:59:07

Job # [id number]

## 2018-12-13 NOTE — CON
DATE:  12/13/2018



CARDIOLOGY CONSULTATION



HISTORY:  The patient is a 43-year-old male who presents with dizziness as

well as fatigue.



The patient has no previous cardiac history.



He has taken aspirin as well as Lipitor for his hypercholesterolemia.



He denies angina and he does admit to climbing up stairs and developing

some shortness of breath.



SOCIAL HISTORY:  The patient denies smoking.  Denies alcohol or drugs.



REVIEW OF SYSTEMS:  Fourteen-point review of systems is reviewed in detail.

No additional cardiac symptoms are noted.



PHYSICAL EXAMINATION:

VITAL SIGNS:  Blood pressure is 127/76, heart rate is in the 80s.

NECK:  Negative JVD.

LUNGS:  Without rales.

HEART:  S1, S2.

EXTREMITIES:  Without edema.



LABORATORY:  His EKG is unremarkable.



Troponins are negative x4.  Hemoglobin is 11.8.



Echocardiogram shows good LV function with no LV outflow obstruction. 

There is mild pulmonary hypertension.



IMPRESSION:

1.  Fatigue.

2.  No evidence for acute coronary syndrome.

3.  Hypercholesterolemia.

4.  Dizziness.

5.  Mild anemia.



Given these findings, the patient's cardiac status is stable.  Given his

risk factors, however, I have discussed with the patient about undergoing a

stress test which can be done electively.  We will give him an appointment

for a stress test next week as an outpatient.





__________________________________________

Scott Meneses MD





DD:  12/13/2018 13:12:16

DT:  12/13/2018 13:17:01

Job # [id number]

## 2018-12-13 NOTE — CARD
--------------- APPROVED REPORT --------------





Date of service: 12/13/2018



EKG Measurement

Heart [id number]

RI [id number]

[id number]

[id number]

[id number]



<Conclusion>

Normal sinus rhythm

Normal ECG